# Patient Record
Sex: MALE | Race: BLACK OR AFRICAN AMERICAN
[De-identification: names, ages, dates, MRNs, and addresses within clinical notes are randomized per-mention and may not be internally consistent; named-entity substitution may affect disease eponyms.]

---

## 2017-05-27 ENCOUNTER — HOSPITAL ENCOUNTER (EMERGENCY)
Dept: HOSPITAL 72 - EMR | Age: 22
Discharge: HOME | End: 2017-05-27
Payer: COMMERCIAL

## 2017-05-27 VITALS — SYSTOLIC BLOOD PRESSURE: 166 MMHG | DIASTOLIC BLOOD PRESSURE: 78 MMHG

## 2017-05-27 VITALS — HEIGHT: 67 IN | WEIGHT: 132 LBS | BODY MASS INDEX: 20.72 KG/M2

## 2017-05-27 DIAGNOSIS — M54.5: Primary | ICD-10-CM

## 2017-05-27 DIAGNOSIS — Z88.6: ICD-10-CM

## 2017-05-27 PROCEDURE — 99283 EMERGENCY DEPT VISIT LOW MDM: CPT

## 2017-05-27 NOTE — EMERGENCY ROOM REPORT
History of Present Illness


General


Chief Complaint:  To Be Triaged


Source:  Patient





Present Illness


HPI


21YOM walk-in with lower back pain for 7 months.  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 

was "about to turn pro," sustained punch to left lower back. Was taken to 

outside hospital for CT and was DCed from ER, told he has "contusion."  Was 

given ibuprofen and T#3 but states ibuprofen made him vomit and T#3 caused his 

face to swell so much that he "went back to the ER."  Is doing physical 

therapy.  Was given Norco 10 previously which states is "the only thing that 

helps the pain."  States ran out a month ago, requesting refill.  Denies other 

medical problems.





Denies urinary/fecal incontinence, lower extremity weakness, history of cancer.


Allergies:  


Coded Allergies:  


     ACETAMINOPHEN (Verified  Allergy, Unknown, 5/27/17)


     HYDROCODONE (Verified  Allergy, Unknown, 5/27/17)


     IBUPROFEN (Verified  Allergy, Unknown, 5/27/17)





Patient History


Past Medical History:  none


Past Surgical History:  none


Pertinent Family History:  none


Social History:  Denies: alcohol use, drug use, smoking


Immunizations:  UTD


Reviewed Nursing Documentation:  PMH: Agreed, PSxH: Agreed





Review of Systems


All Other Systems:  negative except mentioned in HPI





Physical Exam


Sp02 EP Interpretation:  reviewed, normal


General Appearance:  normal inspection, well appearing, no apparent distress, 

alert, GCS 15, non-toxic


Head:  normocephalic, atraumatic


Eyes:  bilateral eye EOMI, bilateral eye PERRL


ENT:  normal ENT inspection, hearing grossly normal, normal voice


Neck:  normal inspection, full range of motion, supple, no bony tend


Respiratory:  normal inspection, lungs clear, normal breath sounds, no 

respiratory distress, no retraction, no accessory muscle use, no wheezing


Cardiovascular #1:  regular rate, rhythm, no edema


Gastrointestinal:  normal inspection, normal bowel sounds, non tender, soft, no 

guarding, no hernia


Genitourinary:  no CVA tenderness


Musculoskeletal:  normal inspection, back normal, normal range of motion, Roxie'

s Sign negative


Neurologic:  normal inspection, alert, oriented x3, responsive, CNs III-XII nml 

as tested, motor strength/tone normal, speech normal


Psychiatric:  normal inspection, judgement/insight normal, mood/affect normal


Skin:  normal inspection, normal color, no rash





Medical Decision Making


ER Course


A: low suspicion for cord compression given well appearance, left paravertebral 

ttp, no focal neuro deficits, duration of pain for 17 months, absence of 

midline ttp/masses and pain worse with movement with known exacerbating activity


Also concern for narcotic-seeking behavior


Per [facility], narco Rx in mid-April and also on May 11


Advised Robaxin as needed, continued PT, hot/cold compress as needed, PMD 

followup


DC home


Status:  improved


Disposition:  HOME, SELF-CARE


Scripts


Methocarbamol* (ROBAXIN-750*) 750 Mg Tablet


750 MG PO TID for low back pain, #30 TAB 0 Refills


   Prov: RUPALI FROST M.D.         5/27/17











RUPALI FROST M.D. May 27, 2017 14:37

## 2021-08-08 ENCOUNTER — HOSPITAL ENCOUNTER (EMERGENCY)
Dept: HOSPITAL 87 - ER | Age: 26
LOS: 1 days | Discharge: HOME | End: 2021-08-09
Payer: MEDICAID

## 2021-08-08 VITALS — WEIGHT: 160.94 LBS | HEIGHT: 69 IN | BODY MASS INDEX: 23.84 KG/M2
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
## 2021-08-08 DIAGNOSIS — T50.901A: Primary | ICD-10-CM

## 2021-08-08 DIAGNOSIS — F15.10: ICD-10-CM

## 2021-08-08 DIAGNOSIS — R41.82: ICD-10-CM

## 2021-08-08 PROCEDURE — 93005 ELECTROCARDIOGRAM TRACING: CPT

## 2021-08-08 PROCEDURE — 80053 COMPREHEN METABOLIC PANEL: CPT

## 2021-08-08 PROCEDURE — 96360 HYDRATION IV INFUSION INIT: CPT

## 2021-08-08 PROCEDURE — 85025 COMPLETE CBC W/AUTO DIFF WBC: CPT

## 2021-08-08 PROCEDURE — G0480 DRUG TEST DEF 1-7 CLASSES: HCPCS

## 2021-08-08 PROCEDURE — 80307 DRUG TEST PRSMV CHEM ANLYZR: CPT

## 2021-08-08 PROCEDURE — 71045 X-RAY EXAM CHEST 1 VIEW: CPT

## 2021-08-08 PROCEDURE — 80320 DRUG SCREEN QUANTALCOHOLS: CPT

## 2021-08-08 PROCEDURE — 99285 EMERGENCY DEPT VISIT HI MDM: CPT

## 2021-08-08 PROCEDURE — 36415 COLL VENOUS BLD VENIPUNCTURE: CPT

## 2021-08-08 PROCEDURE — 80329 ANALGESICS NON-OPIOID 1 OR 2: CPT

## 2021-08-08 PROCEDURE — 70450 CT HEAD/BRAIN W/O DYE: CPT

## 2021-08-09 VITALS — SYSTOLIC BLOOD PRESSURE: 122 MMHG | DIASTOLIC BLOOD PRESSURE: 55 MMHG

## 2021-08-09 LAB
BASOPHILS NFR BLD AUTO: 0.5 % (ref 0–2)
CHLORIDE SERPL-SCNC: 108 MEQ/L (ref 98–107)
EOSINOPHIL NFR BLD AUTO: 0.8 % (ref 0–5)
ERYTHROCYTE [DISTWIDTH] IN BLOOD BY AUTOMATED COUNT: 12.7 % (ref 11.6–14.6)
ETHANOL SERPL-MCNC: < 10 MG/DL
HCT VFR BLD AUTO: 44.7 % (ref 42–52)
HGB BLD-MCNC: 15.3 G/DL (ref 14–18)
LYMPHOCYTES NFR BLD AUTO: 36.3 % (ref 20–50)
MCH RBC QN AUTO: 30.5 PG (ref 28–32)
MCV RBC AUTO: 88.9 FL (ref 80–94)
MONOCYTES NFR BLD AUTO: 11 % (ref 2–8)
NEUTROPHILS NFR BLD AUTO: 51.4 % (ref 40–76)
PLATELET # BLD AUTO: 313 X1000/UL (ref 130–400)
PMV BLD AUTO: 7.7 FL (ref 7.4–10.4)
RBC # BLD AUTO: 5.02 MILL/UL (ref 4.7–6.1)

## 2022-05-11 ENCOUNTER — HOSPITAL ENCOUNTER (EMERGENCY)
Dept: HOSPITAL 87 - ER | Age: 27
LOS: 1 days | Discharge: LEFT BEFORE DISCHARGE | End: 2022-05-12
Payer: MEDICAID

## 2022-05-11 VITALS — SYSTOLIC BLOOD PRESSURE: 133 MMHG | DIASTOLIC BLOOD PRESSURE: 88 MMHG

## 2022-05-11 VITALS — BODY MASS INDEX: 28.4 KG/M2 | WEIGHT: 187.39 LBS | HEIGHT: 68 IN

## 2022-05-11 DIAGNOSIS — Z53.21: Primary | ICD-10-CM
